# Patient Record
Sex: MALE | NOT HISPANIC OR LATINO | URBAN - METROPOLITAN AREA
[De-identification: names, ages, dates, MRNs, and addresses within clinical notes are randomized per-mention and may not be internally consistent; named-entity substitution may affect disease eponyms.]

---

## 2020-03-17 ENCOUNTER — APPOINTMENT (EMERGENCY)
Dept: RADIOLOGY | Facility: HOSPITAL | Age: 20
End: 2020-03-17
Attending: EMERGENCY MEDICINE
Payer: COMMERCIAL

## 2020-03-17 ENCOUNTER — HOSPITAL ENCOUNTER (EMERGENCY)
Facility: HOSPITAL | Age: 20
Discharge: HOME | End: 2020-03-17
Attending: EMERGENCY MEDICINE
Payer: COMMERCIAL

## 2020-03-17 VITALS
HEIGHT: 67 IN | BODY MASS INDEX: 31.39 KG/M2 | DIASTOLIC BLOOD PRESSURE: 92 MMHG | OXYGEN SATURATION: 99 % | WEIGHT: 200 LBS | TEMPERATURE: 98.7 F | SYSTOLIC BLOOD PRESSURE: 147 MMHG | HEART RATE: 66 BPM | RESPIRATION RATE: 18 BRPM

## 2020-03-17 DIAGNOSIS — R05.9 COUGH: Primary | ICD-10-CM

## 2020-03-17 LAB
FLUAV RNA SPEC QL NAA+PROBE: NEGATIVE
FLUBV RNA SPEC QL NAA+PROBE: NEGATIVE
HADV DNA SPEC QL NAA+PROBE: NEGATIVE
HCOV 229E RNA SPEC QL NAA+PROBE: NEGATIVE
HCOV HKU1 RNA SPEC QL NAA+PROBE: NEGATIVE
HCOV NL63 RNA SPEC QL NAA+PROBE: NEGATIVE
HCOV OC43 RNA SPEC QL NAA+PROBE: NEGATIVE
HMPV RNA SPEC QL NAA+PROBE: NEGATIVE
HPIV1 RNA SPEC QL NAA+PROBE: NEGATIVE
HPIV2 RNA SPEC QL NAA+PROBE: NEGATIVE
HPIV3 RNA SPEC QL NAA+PROBE: NEGATIVE
HPIV4 RNA SPEC QL NAA+PROBE: NEGATIVE
RSV RNA SPEC QL NAA+PROBE: NEGATIVE
RV+EV RNA SPEC QL NAA+PROBE: NEGATIVE
TEST PERFORMANCE INFO SPEC: NORMAL

## 2020-03-17 PROCEDURE — 87633 RESP VIRUS 12-25 TARGETS: CPT | Performed by: PHYSICIAN ASSISTANT

## 2020-03-17 PROCEDURE — U0002 COVID-19 LAB TEST NON-CDC: HCPCS | Performed by: EMERGENCY MEDICINE

## 2020-03-17 PROCEDURE — 71046 X-RAY EXAM CHEST 2 VIEWS: CPT

## 2020-03-17 PROCEDURE — 99283 EMERGENCY DEPT VISIT LOW MDM: CPT | Mod: 25

## 2020-03-17 SDOH — HEALTH STABILITY: MENTAL HEALTH: HOW OFTEN DO YOU HAVE A DRINK CONTAINING ALCOHOL?: MONTHLY OR LESS

## 2020-03-17 ASSESSMENT — ENCOUNTER SYMPTOMS
RHINORRHEA: 0
CHILLS: 0
NAUSEA: 0
CONFUSION: 0
SORE THROAT: 0
WHEEZING: 0
ACTIVITY CHANGE: 0
CHOKING: 0
ABDOMINAL PAIN: 0
NECK STIFFNESS: 0
MYALGIAS: 0
BACK PAIN: 0
STRIDOR: 0
VOMITING: 0
COUGH: 1
COLOR CHANGE: 0
EYE DISCHARGE: 0
SHORTNESS OF BREATH: 1
SPEECH DIFFICULTY: 0
HEADACHES: 0
DECREASED CONCENTRATION: 0
EYE REDNESS: 0
SINUS CONGESTION: 0
FEVER: 0

## 2020-03-17 NOTE — DISCHARGE INSTRUCTIONS
If you feel like you are developing symptoms of cough, shortness of breath, fevers please call your doctor.  You may return to the emergency department anytime to be evaluated.  If you develop emergency warning signs of the COVID-19 get medical attention immediately.  In adult the signs include but are not limited to: Difficulty breathing, shortness of breath, persistent pain or pressure in the chest, new confusion or inability to arouse, bluish lips or face.    After the testing it is recommended by the Centers for Disease Control to quarantine yourself by doing the following: Please stay at home for the next 14 days or until you receive the results and monitor your health.  Take your temperature with a thermometer 2 times a day and watch for fever, cough or trouble breathing.  Avoid contact with others.  Do not take public transportation and avoid public places.  Do not travel for this 14-day.  And keep a distance from others (about 6 feet), avoid touching her face nose and eyes, clean and disinfect your home to remove germs.  This includes practice routine cleaning of frequently touched surfaces.    You will be informed of your results by your primary care provider or a [facility] representative will reach out to you.  Please call your family doctor if you have any other concerns.

## 2020-03-17 NOTE — ED ATTESTATION NOTE
Liam San was evaluated and managed by the physician assistant. I was available for immediate consult. I agree with the physician assistants interpretation of laboratory studies as well as the care provided to this patient.           Eugenio Arguello,   03/17/20 1859

## 2020-03-17 NOTE — ED PROVIDER NOTES
HPI     Chief Complaint   Patient presents with   • Cough       Mr. Peterson is a 19-year-old student who complains of 4-5 days of dry cough, minimal chest pain, right parasternal/lower, when coughing.  Notes minimal associated shortness of breath.  Nothing has been tried to alleviate his symptoms.  He denies fever, headache, otalgia, sore throat, abdominal pain, N/V.  Of note, he endorses a H/O e-cigarette usage. He is concerned because approximately 3 days ago, he notes he was in an elevator with someone who he states was coughing uncontrollably with a dry cough.      History provided by:  Patient   used: No    Cough   Cough characteristics:  Dry  Severity:  Mild  Duration:  4 days  Timing:  Intermittent  Progression:  Unchanged  Chronicity:  New  Smoker: no    Context: sick contacts    Context: not exposure to allergens, not fumes and not upper respiratory infection    Relieved by:  None tried  Worsened by:  Nothing  Ineffective treatments:  None tried  Associated symptoms: chest pain and shortness of breath    Associated symptoms: no chills, no ear fullness, no ear pain, no eye discharge, no fever, no headaches, no myalgias, no rhinorrhea, no sinus congestion, no sore throat and no wheezing         Patient History     No past medical history on file.    No past surgical history on file.    No family history on file.    Social History     Tobacco Use   • Smoking status: Not on file   Substance Use Topics   • Alcohol use: Not on file   • Drug use: Not on file       Systems Reviewed from Nursing Triage:          Review of Systems     Review of Systems   Constitutional: Negative for activity change, chills and fever.   HENT: Negative for congestion, ear pain, rhinorrhea and sore throat.    Eyes: Negative for discharge and redness.   Respiratory: Positive for cough and shortness of breath. Negative for choking, wheezing and stridor.    Cardiovascular: Positive for chest pain.   Gastrointestinal:  "Negative for abdominal pain, nausea and vomiting.   Musculoskeletal: Negative for back pain, myalgias and neck stiffness.   Skin: Negative for color change.   Neurological: Negative for speech difficulty and headaches.   Psychiatric/Behavioral: Negative for confusion and decreased concentration.        Physical Exam     ED Triage Vitals   Temp Heart Rate Resp BP SpO2   03/17/20 1025 03/17/20 1022 03/17/20 1022 03/17/20 1025 03/17/20 1022   37.1 °C (98.7 °F) 87 18 131/75 97 %      Temp Source Heart Rate Source Patient Position BP Location FiO2 (%) (Set)   03/17/20 1025 -- 03/17/20 1025 03/17/20 1025 --   Oral  Sitting Right upper arm                      Patient Vitals for the past 24 hrs:   BP Temp Temp src Pulse Resp SpO2 Height Weight   03/17/20 1025 131/75 37.1 °C (98.7 °F) Oral -- -- -- -- --   03/17/20 1022 -- -- -- 87 18 97 % 1.702 m (5' 7\") 90.7 kg (200 lb)                                       Physical Exam   Constitutional: He is oriented to person, place, and time. He appears well-developed and well-nourished. No distress.   HENT:   Head: Normocephalic and atraumatic.   Right Ear: Tympanic membrane and external ear normal.   Left Ear: Tympanic membrane and external ear normal.   Eyes: EOM are normal. No scleral icterus.   Neck: Normal range of motion. Neck supple. No tracheal deviation present.   Cardiovascular: Normal rate, regular rhythm and normal heart sounds.   No murmur heard.  Pulmonary/Chest: Effort normal and breath sounds normal. No stridor. No respiratory distress. He has no wheezes.   Abdominal: Soft.   Musculoskeletal: Normal range of motion. He exhibits no deformity.   Lymphadenopathy:     He has no cervical adenopathy.   Neurological: He is alert and oriented to person, place, and time.   Skin: Skin is warm and dry. He is not diaphoretic. No erythema.   Psychiatric: He has a normal mood and affect. His behavior is normal.            Procedures    ED Course & MDM     Labs Reviewed "   RESPIRATORY VIRUS PANEL, PCR       X-RAY CHEST 2 VIEWS   Final Result   IMPRESSION: No acute abnormality in the chest.      COMMENT: PA and lateral radiographs of the chest are reviewed without prior   studies available for direct comparison. There is no focal consolidation,   pleural effusion, pulmonary edema, or pneumothorax. The cardiomediastinal   silhouette is within normal limits. The visualized osseous structures are   unremarkable.                  [facility]         ED Course as of Mar 17 1400   Tue Mar 17, 2020   1255 Agent concern for Clomid-19.  Was in elevator with someone who had what he describes as an uncontrollable dry cough approximately 3 days ago.  Patient has been instructed to self quarantine at home for 14 days or until his test comes back.  Patient verbalizes understanding.  Also instructed to return to ED if worsening symptoms, fevers, worsening shortness of breath, or anything concerning.  Verbalizes understanding.    [JG]      ED Course User Index  [JG] Fadumo Lan PA C         Clinical Impressions as of Mar 17 1400   Cough        Fadumo Lan PA C  03/17/20 1401

## 2020-03-23 LAB
QUEST OVERALL RESULT:: NOT DETECTED
SARS-COV RNA SPEC QL NAA+PROBE: NEGATIVE
SARS-COV-2 RNA RESP QL NAA+PROBE: NEGATIVE
SARS-COV-2 RNA RESP QL NAA+PROBE: NORMAL
SPECIMEN SOURCE: NORMAL
SPECIMEN SOURCE: NORMAL
SYMPTOM: NORMAL
SYMPTOM: NORMAL

## 2021-01-16 ENCOUNTER — APPOINTMENT (OUTPATIENT)
Dept: GENERAL RADIOLOGY | Age: 21
End: 2021-01-16
Attending: EMERGENCY MEDICINE

## 2021-01-16 ENCOUNTER — HOSPITAL ENCOUNTER (EMERGENCY)
Age: 21
Discharge: HOME | End: 2021-01-16
Attending: EMERGENCY MEDICINE

## 2021-01-16 DIAGNOSIS — S20.212A CHEST WALL CONTUSION, LEFT, INITIAL ENCOUNTER: ICD-10-CM
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
## 2021-01-16 PROCEDURE — 99283 EMERGENCY DEPT VISIT LOW MDM: CPT | Performed by: EMERGENCY MEDICINE

## 2021-01-16 PROCEDURE — 99284 EMERGENCY DEPT VISIT MOD MDM: CPT

## 2021-01-16 PROCEDURE — 71101 X-RAY EXAM UNILAT RIBS/CHEST: CPT

## 2021-01-16 PROCEDURE — 71101 X-RAY EXAM UNILAT RIBS/CHEST: CPT | Performed by: RADIOLOGY

## 2021-01-16 ASSESSMENT — ENCOUNTER SYMPTOMS
GASTROINTESTINAL NEGATIVE: 1
SHORTNESS OF BREATH: 0
COUGH: 0
NEUROLOGICAL NEGATIVE: 1

## 2021-01-16 ASSESSMENT — PAIN SCALES - GENERAL
PAINLEVEL_OUTOF10: 4
PAINLEVEL_OUTOF10: 3

## 2021-01-16 ASSESSMENT — PAIN DESCRIPTION - PAIN TYPE: TYPE: CHEST PAIN

## 2021-01-17 VITALS
DIASTOLIC BLOOD PRESSURE: 75 MMHG | WEIGHT: 205 LBS | OXYGEN SATURATION: 97 % | RESPIRATION RATE: 18 BRPM | TEMPERATURE: 98.6 F | SYSTOLIC BLOOD PRESSURE: 156 MMHG | HEART RATE: 81 BPM

## 2021-11-09 ENCOUNTER — OFFICE VISIT (OUTPATIENT)
Dept: INTERNAL MEDICINE | Facility: CLINIC | Age: 21
End: 2021-11-09
Payer: COMMERCIAL

## 2021-11-09 VITALS
OXYGEN SATURATION: 97 % | TEMPERATURE: 98.1 F | HEIGHT: 66 IN | SYSTOLIC BLOOD PRESSURE: 130 MMHG | RESPIRATION RATE: 18 BRPM | HEART RATE: 84 BPM | WEIGHT: 218 LBS | BODY MASS INDEX: 35.03 KG/M2 | DIASTOLIC BLOOD PRESSURE: 88 MMHG

## 2021-11-09 DIAGNOSIS — Z13.0 SCREENING FOR IRON DEFICIENCY ANEMIA: ICD-10-CM

## 2021-11-09 DIAGNOSIS — Z13.29 SCREENING FOR THYROID DISORDER: ICD-10-CM

## 2021-11-09 DIAGNOSIS — Z11.3 SCREENING EXAMINATION FOR STD (SEXUALLY TRANSMITTED DISEASE): ICD-10-CM

## 2021-11-09 DIAGNOSIS — Z23 NEED FOR IMMUNIZATION AGAINST INFLUENZA: ICD-10-CM

## 2021-11-09 DIAGNOSIS — Z00.00 PREVENTATIVE HEALTH CARE: Primary | ICD-10-CM

## 2021-11-09 DIAGNOSIS — Z13.1 SCREENING FOR DIABETES MELLITUS: ICD-10-CM

## 2021-11-09 DIAGNOSIS — L81.9 HYPOPIGMENTATION: ICD-10-CM

## 2021-11-09 DIAGNOSIS — Z13.220 SCREENING FOR LIPID DISORDERS: ICD-10-CM

## 2021-11-09 PROCEDURE — 99385 PREV VISIT NEW AGE 18-39: CPT | Mod: 25 | Performed by: FAMILY MEDICINE

## 2021-11-09 PROCEDURE — 90686 IIV4 VACC NO PRSV 0.5 ML IM: CPT | Performed by: FAMILY MEDICINE

## 2021-11-09 PROCEDURE — 3008F BODY MASS INDEX DOCD: CPT | Performed by: FAMILY MEDICINE

## 2021-11-09 PROCEDURE — 90471 IMMUNIZATION ADMIN: CPT | Performed by: FAMILY MEDICINE

## 2021-11-09 ASSESSMENT — ENCOUNTER SYMPTOMS
DIZZINESS: 0
SORE THROAT: 0
ARTHRALGIAS: 0
ADENOPATHY: 0
HEADACHES: 0
DIFFICULTY URINATING: 0
ABDOMINAL PAIN: 0
PALPITATIONS: 0
DYSPHORIC MOOD: 0
CHILLS: 0
DIARRHEA: 0
FEVER: 0
NAUSEA: 0
SHORTNESS OF BREATH: 0
VOMITING: 0
MYALGIAS: 0
COUGH: 0

## 2021-11-09 ASSESSMENT — PATIENT HEALTH QUESTIONNAIRE - PHQ9: SUM OF ALL RESPONSES TO PHQ9 QUESTIONS 1 & 2: 0

## 2021-11-09 NOTE — PROGRESS NOTES
"Patient ID: Liam San is a 21 y.o. male.        Subjective     New patient here for physical  No recent primary care   No chronic medical issues  In school at [employer] - wants to go to law school- LSAT pending   Former smoker- 2 years quit 1 year- vaping since \"a lot\"   Dentist - due   Eye exam-utd  Not currently sexually active but would like std screening, no sxs    Tdap in the last 10 years- has records at home   Had covid vaccines             The following have been reviewed and updated as appropriate in this visit:  Tobacco  Allergies  Meds  Problems  Med Hx  Surg Hx  Fam Hx       Patient Active Problem List   Diagnosis   • Olecranon fracture   • Preventative health care   • Hypopigmentation     No current outpatient medications on file.    No Known Allergies    Review of Systems   Constitutional: Negative for chills and fever.   HENT: Negative for congestion and sore throat.    Eyes: Negative for visual disturbance.   Respiratory: Negative for cough and shortness of breath.    Cardiovascular: Negative for chest pain and palpitations.   Gastrointestinal: Negative for abdominal pain, diarrhea, nausea and vomiting.   Endocrine: Negative for polyuria.   Genitourinary: Negative for difficulty urinating.   Musculoskeletal: Negative for arthralgias and myalgias.   Skin: Positive for rash.        Hypopigmented spots legs    Allergic/Immunologic: Negative for environmental allergies and food allergies.   Neurological: Negative for dizziness and headaches.   Hematological: Negative for adenopathy.   Psychiatric/Behavioral: Negative for dysphoric mood.       Social History     Tobacco Use   Smoking Status Current Every Day Smoker   • Types: Electronic Cigarette, Cigarettes   • Start date: 3/17/2014   Smokeless Tobacco Never Used   Tobacco Comment    vap     Social History     Substance and Sexual Activity   Alcohol Use Not Currently     Social History     Substance and Sexual Activity   Drug Use Never " "      Objective     Vitals:    11/09/21 1117   BP: 130/88   BP Location: Left upper arm   Patient Position: Sitting   Pulse: 84   Resp: 18   Temp: 36.7 °C (98.1 °F)   SpO2: 97%   Weight: 98.9 kg (218 lb)   Height: 1.664 m (5' 5.5\")     Physical Exam  Vitals reviewed.   Constitutional:       Appearance: Normal appearance.   HENT:      Head: Normocephalic and atraumatic.      Right Ear: Tympanic membrane, ear canal and external ear normal.      Left Ear: Tympanic membrane, ear canal and external ear normal.   Eyes:      General:         Left eye: No discharge.      Extraocular Movements: Extraocular movements intact.      Conjunctiva/sclera: Conjunctivae normal.      Pupils: Pupils are equal, round, and reactive to light.   Cardiovascular:      Rate and Rhythm: Normal rate.      Heart sounds: Normal heart sounds. No murmur heard.      Pulmonary:      Effort: Pulmonary effort is normal.      Breath sounds: Normal breath sounds. No wheezing.   Abdominal:      Palpations: Abdomen is soft.      Tenderness: There is no abdominal tenderness.   Musculoskeletal:         General: Normal range of motion.      Cervical back: Normal range of motion.      Right lower leg: No edema.      Left lower leg: No edema.   Lymphadenopathy:      Cervical: No cervical adenopathy.   Skin:     General: Skin is warm and dry.      Findings: Rash present.   Neurological:      General: No focal deficit present.      Mental Status: He is alert.   Psychiatric:         Mood and Affect: Mood normal.         Behavior: Behavior normal.         Thought Content: Thought content normal.         Judgment: Judgment normal.         Assessment/Plan     Problem List Items Addressed This Visit        Other    Preventative health care - Primary     Healthy adult   Screening labs ordered  Counseled about the importance of healthy diet and regular exercise   Counseled about the importance of routine dental and eye exams  Counseled about condom use and std " prevention  Vaccines up to date- will bring Tdap records           Hypopigmentation     Faintly hypopigmented  Pt says it get more prominent  ?eczema  Referred to derm at pt request         Relevant Orders    Ambulatory referral to Dermatology      Other Visit Diagnoses     Screening for iron deficiency anemia        Relevant Orders    CBC and differential    Screening for diabetes mellitus        Relevant Orders    Hemoglobin A1c    Screening for lipid disorders        Relevant Orders    Comprehensive metabolic panel    Lipid panel    Screening for thyroid disorder        Relevant Orders    TSH    Screening examination for STD (sexually transmitted disease)        Relevant Orders    Chlamydia/GC RNA:ThinPrep/Urine/Swab    Hepatitis C antibody    HIV 1,2 AB P24 AG    RPR    Need for immunization against influenza        Relevant Orders    Influenza vaccine quadrivalent preservative free 6 month and older IM (Completed)            Yamini Mejia MD  11/21/2021

## 2021-11-21 PROBLEM — Z00.00 PREVENTATIVE HEALTH CARE: Status: ACTIVE | Noted: 2021-11-21

## 2021-11-21 PROBLEM — L81.9 HYPOPIGMENTATION: Status: ACTIVE | Noted: 2021-11-21

## 2021-11-22 NOTE — ASSESSMENT & PLAN NOTE
Healthy adult   Screening labs ordered  Counseled about the importance of healthy diet and regular exercise   Counseled about the importance of routine dental and eye exams  Counseled about condom use and std prevention  Vaccines up to date- will bring Tdap records

## 2023-07-26 LAB
ALBUMIN SERPL-MCNC: 4.9 G/DL (ref 4.3–5.2)
ALBUMIN/GLOB SERPL: 2.1 {RATIO} (ref 1.2–2.2)
ALP SERPL-CCNC: 79 IU/L (ref 44–121)
ALT SERPL-CCNC: 10 IU/L (ref 0–44)
AST SERPL-CCNC: 14 IU/L (ref 0–40)
BASOPHILS # BLD AUTO: 0 X10E3/UL (ref 0–0.2)
BASOPHILS NFR BLD AUTO: 0 %
BILIRUB SERPL-MCNC: 0.8 MG/DL (ref 0–1.2)
BUN SERPL-MCNC: 13 MG/DL (ref 6–20)
BUN/CREAT SERPL: 14 (ref 9–20)
CALCIUM SERPL-MCNC: 9.7 MG/DL (ref 8.7–10.2)
CHLORIDE SERPL-SCNC: 106 MMOL/L (ref 96–106)
CHOLEST SERPL-MCNC: 210 MG/DL (ref 100–199)
CO2 SERPL-SCNC: 21 MMOL/L (ref 20–29)
CREAT SERPL-MCNC: 0.93 MG/DL (ref 0.76–1.27)
EGFRCR SERPLBLD CKD-EPI 2021: 118 ML/MIN/1.73
EOSINOPHIL # BLD AUTO: 0.4 X10E3/UL (ref 0–0.4)
EOSINOPHIL NFR BLD AUTO: 8 %
ERYTHROCYTE [DISTWIDTH] IN BLOOD BY AUTOMATED COUNT: 12.6 % (ref 11.6–15.4)
GLOBULIN SER CALC-MCNC: 2.3 G/DL (ref 1.5–4.5)
GLUCOSE SERPL-MCNC: 88 MG/DL (ref 70–99)
HBA1C MFR BLD: 5.2 % (ref 4.8–5.6)
HCT VFR BLD AUTO: 46.4 % (ref 37.5–51)
HCV IGG SERPL QL IA: NON REACTIVE
HDLC SERPL-MCNC: 47 MG/DL
HGB BLD-MCNC: 15.7 G/DL (ref 13–17.7)
HIV 1+2 AB+HIV1 P24 AG SERPL QL IA: NON REACTIVE
IMM GRANULOCYTES # BLD AUTO: 0 X10E3/UL (ref 0–0.1)
IMM GRANULOCYTES NFR BLD AUTO: 0 %
LDLC SERPL CALC-MCNC: 148 MG/DL (ref 0–99)
LYMPHOCYTES # BLD AUTO: 1.7 X10E3/UL (ref 0.7–3.1)
LYMPHOCYTES NFR BLD AUTO: 34 %
MCH RBC QN AUTO: 30.4 PG (ref 26.6–33)
MCHC RBC AUTO-ENTMCNC: 33.8 G/DL (ref 31.5–35.7)
MCV RBC AUTO: 90 FL (ref 79–97)
MONOCYTES # BLD AUTO: 0.4 X10E3/UL (ref 0.1–0.9)
MONOCYTES NFR BLD AUTO: 8 %
NEUTROPHILS # BLD AUTO: 2.5 X10E3/UL (ref 1.4–7)
NEUTROPHILS NFR BLD AUTO: 50 %
PLATELET # BLD AUTO: 281 X10E3/UL (ref 150–450)
POTASSIUM SERPL-SCNC: 4.3 MMOL/L (ref 3.5–5.2)
PROT SERPL-MCNC: 7.2 G/DL (ref 6–8.5)
RBC # BLD AUTO: 5.16 X10E6/UL (ref 4.14–5.8)
RPR SER QL: NON REACTIVE
SODIUM SERPL-SCNC: 142 MMOL/L (ref 134–144)
TRIGL SERPL-MCNC: 83 MG/DL (ref 0–149)
TSH SERPL DL<=0.005 MIU/L-ACNC: 0.95 UIU/ML (ref 0.45–4.5)
VLDLC SERPL CALC-MCNC: 15 MG/DL (ref 5–40)
WBC # BLD AUTO: 5 X10E3/UL (ref 3.4–10.8)